# Patient Record
Sex: FEMALE | Race: WHITE | HISPANIC OR LATINO | Employment: OTHER | URBAN - METROPOLITAN AREA
[De-identification: names, ages, dates, MRNs, and addresses within clinical notes are randomized per-mention and may not be internally consistent; named-entity substitution may affect disease eponyms.]

---

## 2017-02-27 ENCOUNTER — ALLSCRIPTS OFFICE VISIT (OUTPATIENT)
Dept: OTHER | Facility: OTHER | Age: 47
End: 2017-02-27

## 2017-02-27 DIAGNOSIS — M77.41 METATARSALGIA OF RIGHT FOOT: ICD-10-CM

## 2017-02-27 DIAGNOSIS — M77.42 METATARSALGIA OF LEFT FOOT: ICD-10-CM

## 2017-02-27 DIAGNOSIS — M79.672 PAIN OF LEFT FOOT: ICD-10-CM

## 2017-02-28 ENCOUNTER — TRANSCRIBE ORDERS (OUTPATIENT)
Dept: ADMINISTRATIVE | Facility: HOSPITAL | Age: 47
End: 2017-02-28

## 2017-02-28 DIAGNOSIS — R52 PAIN: Primary | ICD-10-CM

## 2017-06-12 ENCOUNTER — ALLSCRIPTS OFFICE VISIT (OUTPATIENT)
Dept: OTHER | Facility: OTHER | Age: 47
End: 2017-06-12

## 2017-06-12 DIAGNOSIS — B35.1 TINEA UNGUIUM: ICD-10-CM

## 2017-06-13 ENCOUNTER — APPOINTMENT (OUTPATIENT)
Dept: LAB | Facility: HOSPITAL | Age: 47
End: 2017-06-13
Payer: COMMERCIAL

## 2017-06-13 DIAGNOSIS — B35.1 TINEA UNGUIUM: ICD-10-CM

## 2017-06-13 PROCEDURE — 87107 FUNGI IDENTIFICATION MOLD: CPT

## 2017-06-13 PROCEDURE — 87102 FUNGUS ISOLATION CULTURE: CPT

## 2017-07-10 ENCOUNTER — ALLSCRIPTS OFFICE VISIT (OUTPATIENT)
Dept: OTHER | Facility: OTHER | Age: 47
End: 2017-07-10

## 2017-07-24 LAB
FUNGUS SPEC CULT: NORMAL
FUNGUS SPEC CULT: NORMAL

## 2017-11-27 ENCOUNTER — ALLSCRIPTS OFFICE VISIT (OUTPATIENT)
Dept: OTHER | Facility: OTHER | Age: 47
End: 2017-11-27

## 2018-01-11 NOTE — MISCELLANEOUS
Provider Comments  Provider Comments:   Patient no-showed her 9am podiatry appt today 07/10/17        Signatures   Electronically signed by : COLLIN Dotson ; Jul 10 2017  9:58AM EST                       (Author)

## 2018-01-12 VITALS
HEIGHT: 65 IN | WEIGHT: 210.25 LBS | OXYGEN SATURATION: 98 % | BODY MASS INDEX: 35.03 KG/M2 | RESPIRATION RATE: 20 BRPM | TEMPERATURE: 97 F | SYSTOLIC BLOOD PRESSURE: 120 MMHG | HEART RATE: 84 BPM | DIASTOLIC BLOOD PRESSURE: 82 MMHG

## 2018-01-13 VITALS
RESPIRATION RATE: 20 BRPM | BODY MASS INDEX: 33.74 KG/M2 | HEART RATE: 86 BPM | DIASTOLIC BLOOD PRESSURE: 68 MMHG | SYSTOLIC BLOOD PRESSURE: 104 MMHG | WEIGHT: 202.5 LBS | HEIGHT: 65 IN | OXYGEN SATURATION: 98 % | TEMPERATURE: 97.4 F

## 2018-01-16 NOTE — PROGRESS NOTES
Assessment    1  Onychomycosis (110 1) (B35 1)   2  Type 2 diabetes mellitus with diabetic neuropathy, unspecified long term insulin use   status (250 60,357 2) (E11 40)   3  Hammer toes of both feet (735 4) (M20 41,M20 42)    Plan   Follow-up visit in 3 months Evaluation and Treatment  Follow-up  Status: Hold For - Scheduling  Requested for: [id number]  Ordered; For: Hammer toes of both feet;  Ordered By: Malachi Carlin  Performed:   Due: [id number]     Discussion/Summary  The patient is instructed to follow-up in 3 month(s)  24-year-old type 2 diabetic female presents today with onychomycosis, and hammertoe deformities bilaterally  -patient was examined, evaluated, and treated with all questions/concerns addressed  -mycotic toenails were sharply trimmed to normal length and thickness with a large nail Nipper without incident   -she was instructed to get her diabetic shoes modified in order to accommodate the hammertoe deformity  A larger toe box was recommended  -RTC in 3 months  Chief Complaint  follow up diabetic foot care, nail care, walks with use of cane      History of Present Illness  HPI: 24-year-old type 2 diabetic female presents today for diabetic foot risk assessment, painful toenails bilaterally, and pain in her toes  She states that she has pain in all digits describes it as a sharp shooting pain  She states that she has not been wearing her diabetic shoes because they cause her too much pain  She states that her elongated toenails also cause her discomfort when wearing shoes  She states that she checks her blood sugar every morning  Her blood sugar this morning was 113 milligrams/deciliter  She denies any recent nausea, vomiting, fever, chills, shortness of breath, or chest pains  Review of Systems    Constitutional: as noted in HPI    ENT: as noted in HPI  Cardiovascular: as noted in HPI  Respiratory: as noted in HPI  Gastrointestinal: as noted in HPI     Genitourinary: as noted in HPI  Musculoskeletal: as noted in HPI  Integumentary: as noted in HPI  Neurological: as noted in HPI  ROS reviewed  Active Problems    1  Bilateral foot pain (729 5) (M79 671,M79 672)   2  Deformity of foot, unspecified laterality (736 70) (M21 969)   3  Diabetes mellitus (250 00) (E11 9)   4  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, with long-term current use of insulin   (249 50,362 04,362 07,V58 67) (E08 3213,Z79 4)   5  Essential hypertension (401 9) (I10)   6  Hammer toes of both feet (735 4) (M20 41,M20 42)   7  Left foot pain (729 5) (M79 672)   8  Metatarsalgia of both feet (726 70) (M77 41,M77 42)   9  Onychomycosis (110 1) (B35 1)   10  Type 2 diabetes mellitus with diabetic neuropathy, unspecified long term insulin use    status (250 60,357 2) (E11 40)    Past Medical History    · History of Benign essential hypertension (401 1) (I10)   · History of type 2 diabetes mellitus (V12 29) (Z86 39)    The active problems and past medical history were reviewed and updated today  Surgical History    · History of Appendectomy   · History of  Section   · History of Foot Surgery   · History of Hand Surgery   · History of Shoulder Surgery   · History of Thyroid Surgery Sub-Total Thyroidectomy    The surgical history was reviewed and updated today  Family History    The family history was reviewed and updated today  Social History    · Current every day smoker (305 1) (F17 200)  The social history was reviewed and updated today  The social history was reviewed and is unchanged  Current Meds   1  Baclofen TABS; Therapy: (Recorded:2016) to Recorded   2  Citalopram Hydrobromide 20 MG Oral Tablet; Therapy: (Recorded:2016) to Recorded   3  Cozaar TABS; Therapy: (Recorded:2016) to Recorded   4  Gabapentin 300 MG Oral Capsule;    Therapy: (Recorded:2016) to Recorded   5  Glimepiride TABS; Therapy: (Recorded:24Oct2016) to Recorded   6  MetFORMIN HCl - 1000 MG Oral Tablet; Therapy: (Recorded:24Oct2016) to Recorded   7  Plavix TABS; Therapy: (Recorded:24Oct2016) to Recorded   8  Potassium Chloride 10 MEQ TBCR; Therapy: (Recorded:24Oct2016) to Recorded   9  Simvastatin 10 MG Oral Tablet; Therapy: (Recorded:[id number]) to Recorded    The medication list was reviewed and updated today  Allergies    1  No Known Drug Allergies    Vitals   Recorded: [id number] 10:12AM   Temperature 97 F, Tympanic   Heart Rate 92   Respiration 20   Systolic 727   Diastolic 78   Height 5 ft 5 in   Weight 204 lb    BMI Calculated 33 95   BSA Calculated 2   O2 Saturation 98   LMP 23-Nov-2017   Pain Scale 8     Physical Exam    Constitutional: no acute distress, well appearing and well nourished  Pulmonary: no increased work of breathing or signs of respiratory distress  Cardiovascular: DP and PT pulses are 1/4 bilaterally  Capillary refill time is less than 3 seconds all digits bilaterally  No edema noted bilaterally  Lymphatic: without lymphadenopathy  Orthopedic/Biomechanical: MMT is 5/5 in all compartments of the foot bilaterally  Hammertoe deformities noted on digits 2 through 5 bilaterally  Mild pain on palpation noted at the PIPJ to all digits bilaterally  Skin: Elongated, mycotic toenails to all digits bilaterally  No ID macerations, or open lesions noted bilaterally  Neurologic: Gross sensation, and protective sensation is intact bilaterally  Psychiatric: oriented to person, place, and time  Procedure      Procedure: toenail debridement performed  Indications for the procedure include professional performance of nail care required due to physical limitations  The procedure's were discussed with the patient    Procedure Note:  Post-Procedure:      Future Appointments    Date/Time Provider Specialty Site   03/05/2018 09:30 AM Rox ARRIAZA, Podiatry  [facility] [address]     Signatures   Electronically signed by : Nilesh Osullivan DPM; Nov 27 2017 10:41AM EST                       (Author)    Electronically signed by : Dora Xiao DPM; Nov 27 2017 11:49AM EST                       (Author)

## 2018-01-22 VITALS
TEMPERATURE: 97 F | DIASTOLIC BLOOD PRESSURE: 78 MMHG | SYSTOLIC BLOOD PRESSURE: 112 MMHG | OXYGEN SATURATION: 98 % | BODY MASS INDEX: 33.99 KG/M2 | HEART RATE: 92 BPM | RESPIRATION RATE: 20 BRPM | HEIGHT: 65 IN | WEIGHT: 204 LBS

## 2018-03-05 ENCOUNTER — OFFICE VISIT (OUTPATIENT)
Dept: FAMILY MEDICINE CLINIC | Facility: CLINIC | Age: 48
End: 2018-03-05
Payer: COMMERCIAL

## 2018-03-05 VITALS
HEIGHT: 65 IN | OXYGEN SATURATION: 98 % | WEIGHT: 199 LBS | RESPIRATION RATE: 20 BRPM | HEART RATE: 92 BPM | DIASTOLIC BLOOD PRESSURE: 70 MMHG | SYSTOLIC BLOOD PRESSURE: 110 MMHG | BODY MASS INDEX: 33.15 KG/M2 | TEMPERATURE: 96.5 F

## 2018-03-05 DIAGNOSIS — E11.40 CONTROLLED TYPE 2 DIABETES WITH NEUROPATHY (HCC): ICD-10-CM

## 2018-03-05 DIAGNOSIS — B35.1 ONYCHOMYCOSIS: Primary | ICD-10-CM

## 2018-03-05 PROCEDURE — 3008F BODY MASS INDEX DOCD: CPT | Performed by: PODIATRIST

## 2018-03-05 PROCEDURE — 99213 OFFICE O/P EST LOW 20 MIN: CPT | Performed by: PODIATRIST

## 2018-03-05 NOTE — PROGRESS NOTES
Routine Foot Care- Podiatry   Nelli Golden 46 y o  female MRN: [id number]  Encounter: [id number]    Assessment/Plan     Assessment:  1  Onychomycosis  2  DM    Plan:  - Patient was seen/examined  All questions and concerns addressed  - Nails debrided x10 without incidence utilizing a sharp nail nipper  - Stressed the importance of glycemic control, shoe gear, and proper diabetic foot care   -gave pt rx for ciclopirox and advised her to apply once daily and remove every 7th day  -advised pt to wear her right ankle brace more often due to her hx of R CVA and feeling unstable while ambulating  - RTC in 6 months      History of Present Illness     HPI:  Nelli Golden is a 46 y o  female who presents with elongated toenails  States that their nails are painful, elongated  They have difficulty applying their socks and shoes  The pressure within their shoe gear is painful and they have been unable to cut their nails adequately  Patient admits to numbness/tingling  They state their last blood glucose level was 160 mg/dL  The patient denies any nausea, vomiting, fever, chills, shortness of breath, or chest pains  Consults  Review of Systems   Constitutional: Negative  HENT: Negative  Eyes: Negative  Respiratory: Negative  Cardiovascular: Negative  Gastrointestinal: Negative  Musculoskeletal: Negative   Skin: elongated thickened toenails  Neurological: Numbness and tingling          Historical Information   Past Medical History:   Diagnosis Date    Hypertension     last assessed 10/24/2016    Type 2 diabetes mellitus ([facility] [address] )     last assessed 10/24/2016     Past Surgical History:   Procedure Laterality Date    APPENDECTOMY      last assessed 10/24/2016     SECTION      last assessed 10/24/2016    FOOT SURGERY      last assessed 10/24/2016    HAND SURGERY      last assessed 10/24/2016    THYROIDECTOMY      sub-total, last assessed 10/24/2016     Social History   History   Alcohol Use No History   Drug Use No     History   Smoking Status    Current Every Day Smoker   Smokeless Tobacco    Never Used     Family History: No family history on file  Meds/Allergies     (Not in a hospital admission)  Allergies no known allergies    Objective     Current Vitals:   Blood Pressure: 110/70 (03/05/18 0934)  Pulse: 92 (03/05/18 0934)  Temperature: (!) 96 5 °F (35 8 °C) (03/05/18 0934)  Temp Source: Tympanic (03/05/18 0934)  Respirations: 20 (03/05/18 0934)  Height: 5' 5" (165 1 cm) (03/05/18 0934)  Weight - Scale: 90 3 kg (199 lb) (03/05/18 0934)  SpO2: 98 % (03/05/18 0934)        /70   Pulse 92   Temp (!) 96 5 °F (35 8 °C) (Tympanic)   Resp 20   Ht 5' 5" (1 651 m)   Wt 90 3 kg (199 lb)   SpO2 98%   BMI 33 12 kg/m²     General Appearance:    Alert, cooperative, no distress   Head:    Normocephalic, without obvious abnormality, atraumatic   Eyes:    PERRL, conjunctiva/corneas clear, EOM's intact            Nose:   Moist mucous membranes, no drainage or sinus tenderness   Throat:   No tenderness, no exudates   Neck:   Supple, symmetrical, trachea midline, no JVD   Back:     Symmetric, no CVA tenderness   Lungs:     Clear to auscultation bilaterally, respirations unlabored   Chest wall:    No tenderness or deformity   Heart:    Regular rate and rhythm, S1 and S2 normal, no murmur, rub   or gallop   Abdomen:     Soft, non-tender, bowel sounds active all four quadrants,     no masses, no organomegaly           Extremities:   MMT is 5/5 to all compartments of the LLE, and 4/5 on right LE +0/4 edema B/L, Digital ROM is intact, HT 2-5 b/l   Pulses:   R DP is +2/4, R PT is +1/4, L DP is +2/4, L PT is +1/4, CFT< 3sec to all digits  Pedal hair is Absent   Skin:   Nails are thickened, elongated, TTP with notable subungual debris  No open Lesions  Skin of the LE is normal texture, turgor  Neurologic:   CNII-XII intact  Normal strength, sensation and reflexes       Throughout   Gross sensation is intact   Protective sensation is Diminished

## 2022-04-29 LAB — FUNGUS ISLT: NORMAL
